# Patient Record
Sex: MALE | Race: WHITE | NOT HISPANIC OR LATINO | Employment: UNEMPLOYED | URBAN - METROPOLITAN AREA
[De-identification: names, ages, dates, MRNs, and addresses within clinical notes are randomized per-mention and may not be internally consistent; named-entity substitution may affect disease eponyms.]

---

## 2022-05-20 ENCOUNTER — HOSPITAL ENCOUNTER (INPATIENT)
Facility: HOSPITAL | Age: 62
LOS: 2 days | Discharge: HOME | DRG: 392 | End: 2022-05-22
Attending: EMERGENCY MEDICINE | Admitting: FAMILY MEDICINE
Payer: OTHER GOVERNMENT

## 2022-05-20 DIAGNOSIS — R07.9 CHEST PAIN: ICD-10-CM

## 2022-05-20 DIAGNOSIS — K57.92 ACUTE DIVERTICULITIS: Primary | ICD-10-CM

## 2022-05-20 DIAGNOSIS — K57.92 ACUTE DIVERTICULITIS: ICD-10-CM

## 2022-05-20 PROBLEM — D72.829 LEUKOCYTOSIS: Status: ACTIVE | Noted: 2022-05-20

## 2022-05-20 PROBLEM — I10 ESSENTIAL HYPERTENSION: Status: ACTIVE | Noted: 2022-05-20

## 2022-05-20 LAB
ALBUMIN SERPL BCP-MCNC: 4.2 G/DL (ref 3.5–5.2)
ALP SERPL-CCNC: 44 U/L (ref 55–135)
ALT SERPL W/O P-5'-P-CCNC: 20 U/L (ref 10–44)
ANION GAP SERPL CALC-SCNC: 8 MMOL/L (ref 8–16)
AST SERPL-CCNC: 18 U/L (ref 10–40)
BASOPHILS # BLD AUTO: 0.04 K/UL (ref 0–0.2)
BASOPHILS NFR BLD: 0.3 % (ref 0–1.9)
BILIRUB SERPL-MCNC: 1.5 MG/DL (ref 0.1–1)
BILIRUB UR QL STRIP: NEGATIVE
BUN SERPL-MCNC: 16 MG/DL (ref 6–20)
CALCIUM SERPL-MCNC: 9.3 MG/DL (ref 8.7–10.5)
CHLORIDE SERPL-SCNC: 100 MMOL/L (ref 95–110)
CLARITY UR: CLEAR
CO2 SERPL-SCNC: 25 MMOL/L (ref 23–29)
COLOR UR: YELLOW
CREAT SERPL-MCNC: 1.3 MG/DL (ref 0.5–1.4)
DIFFERENTIAL METHOD: ABNORMAL
EOSINOPHIL # BLD AUTO: 0.1 K/UL (ref 0–0.5)
EOSINOPHIL NFR BLD: 0.5 % (ref 0–8)
ERYTHROCYTE [DISTWIDTH] IN BLOOD BY AUTOMATED COUNT: 12.9 % (ref 11.5–14.5)
EST. GFR  (AFRICAN AMERICAN): >60 ML/MIN/1.73 M^2
EST. GFR  (NON AFRICAN AMERICAN): 59.7 ML/MIN/1.73 M^2
GLUCOSE SERPL-MCNC: 106 MG/DL (ref 70–110)
GLUCOSE UR QL STRIP: NEGATIVE
HCT VFR BLD AUTO: 44.6 % (ref 40–54)
HGB BLD-MCNC: 15 G/DL (ref 14–18)
HGB UR QL STRIP: NEGATIVE
IMM GRANULOCYTES # BLD AUTO: 0.06 K/UL (ref 0–0.04)
IMM GRANULOCYTES NFR BLD AUTO: 0.5 % (ref 0–0.5)
KETONES UR QL STRIP: NEGATIVE
LEUKOCYTE ESTERASE UR QL STRIP: NEGATIVE
LIPASE SERPL-CCNC: 34 U/L (ref 4–60)
LYMPHOCYTES # BLD AUTO: 2.3 K/UL (ref 1–4.8)
LYMPHOCYTES NFR BLD: 17.2 % (ref 18–48)
MCH RBC QN AUTO: 31.2 PG (ref 27–31)
MCHC RBC AUTO-ENTMCNC: 33.6 G/DL (ref 32–36)
MCV RBC AUTO: 93 FL (ref 82–98)
MONOCYTES # BLD AUTO: 1.4 K/UL (ref 0.3–1)
MONOCYTES NFR BLD: 10.3 % (ref 4–15)
NEUTROPHILS # BLD AUTO: 9.5 K/UL (ref 1.8–7.7)
NEUTROPHILS NFR BLD: 71.2 % (ref 38–73)
NITRITE UR QL STRIP: NEGATIVE
NRBC BLD-RTO: 0 /100 WBC
PH UR STRIP: 6 [PH] (ref 5–8)
PLATELET # BLD AUTO: 258 K/UL (ref 150–450)
PMV BLD AUTO: 10.2 FL (ref 9.2–12.9)
POTASSIUM SERPL-SCNC: 3.5 MMOL/L (ref 3.5–5.1)
PROT SERPL-MCNC: 7.3 G/DL (ref 6–8.4)
PROT UR QL STRIP: NEGATIVE
RBC # BLD AUTO: 4.81 M/UL (ref 4.6–6.2)
SARS-COV-2 RDRP RESP QL NAA+PROBE: NEGATIVE
SODIUM SERPL-SCNC: 133 MMOL/L (ref 136–145)
SP GR UR STRIP: 1.01 (ref 1–1.03)
URN SPEC COLLECT METH UR: NORMAL
UROBILINOGEN UR STRIP-ACNC: NEGATIVE EU/DL
WBC # BLD AUTO: 13.3 K/UL (ref 3.9–12.7)

## 2022-05-20 PROCEDURE — 12000002 HC ACUTE/MED SURGE SEMI-PRIVATE ROOM

## 2022-05-20 PROCEDURE — 81003 URINALYSIS AUTO W/O SCOPE: CPT | Performed by: EMERGENCY MEDICINE

## 2022-05-20 PROCEDURE — 83690 ASSAY OF LIPASE: CPT | Performed by: EMERGENCY MEDICINE

## 2022-05-20 PROCEDURE — 25000003 PHARM REV CODE 250: Performed by: EMERGENCY MEDICINE

## 2022-05-20 PROCEDURE — 25000003 PHARM REV CODE 250: Performed by: NURSE PRACTITIONER

## 2022-05-20 PROCEDURE — U0002 COVID-19 LAB TEST NON-CDC: HCPCS | Performed by: NURSE PRACTITIONER

## 2022-05-20 PROCEDURE — 63600175 PHARM REV CODE 636 W HCPCS: Performed by: NURSE PRACTITIONER

## 2022-05-20 PROCEDURE — 99285 EMERGENCY DEPT VISIT HI MDM: CPT | Mod: 25

## 2022-05-20 PROCEDURE — 85025 COMPLETE CBC W/AUTO DIFF WBC: CPT | Performed by: EMERGENCY MEDICINE

## 2022-05-20 PROCEDURE — S0030 INJECTION, METRONIDAZOLE: HCPCS | Performed by: EMERGENCY MEDICINE

## 2022-05-20 PROCEDURE — 80053 COMPREHEN METABOLIC PANEL: CPT | Performed by: EMERGENCY MEDICINE

## 2022-05-20 RX ORDER — METRONIDAZOLE 500 MG/1
500 TABLET ORAL
COMMUNITY
Start: 2021-10-18 | End: 2022-05-20

## 2022-05-20 RX ORDER — AMLODIPINE BESYLATE 5 MG/1
5 TABLET ORAL DAILY
Status: DISCONTINUED | OUTPATIENT
Start: 2022-05-21 | End: 2022-05-22 | Stop reason: HOSPADM

## 2022-05-20 RX ORDER — TALC
6 POWDER (GRAM) TOPICAL NIGHTLY PRN
Status: DISCONTINUED | OUTPATIENT
Start: 2022-05-20 | End: 2022-05-22 | Stop reason: HOSPADM

## 2022-05-20 RX ORDER — LANOLIN ALCOHOL/MO/W.PET/CERES
800 CREAM (GRAM) TOPICAL
Status: DISCONTINUED | OUTPATIENT
Start: 2022-05-20 | End: 2022-05-22 | Stop reason: HOSPADM

## 2022-05-20 RX ORDER — SIMVASTATIN 40 MG/1
40 TABLET, FILM COATED ORAL DAILY
COMMUNITY
Start: 2022-04-18

## 2022-05-20 RX ORDER — HYDROCODONE BITARTRATE AND ACETAMINOPHEN 5; 325 MG/1; MG/1
1 TABLET ORAL EVERY 6 HOURS PRN
Status: DISCONTINUED | OUTPATIENT
Start: 2022-05-20 | End: 2022-05-22 | Stop reason: HOSPADM

## 2022-05-20 RX ORDER — TELMISARTAN 40 MG/1
40 TABLET ORAL DAILY
COMMUNITY
Start: 2022-04-18

## 2022-05-20 RX ORDER — MULTIVITAMIN
1 TABLET ORAL DAILY
COMMUNITY

## 2022-05-20 RX ORDER — SODIUM CHLORIDE 9 MG/ML
1000 INJECTION, SOLUTION INTRAVENOUS
Status: COMPLETED | OUTPATIENT
Start: 2022-05-20 | End: 2022-05-20

## 2022-05-20 RX ORDER — MAGNESIUM SULFATE HEPTAHYDRATE 40 MG/ML
2 INJECTION, SOLUTION INTRAVENOUS
Status: DISCONTINUED | OUTPATIENT
Start: 2022-05-20 | End: 2022-05-22 | Stop reason: HOSPADM

## 2022-05-20 RX ORDER — FENOFIBRATE 145 MG/1
145 TABLET, FILM COATED ORAL DAILY
Status: DISCONTINUED | OUTPATIENT
Start: 2022-05-21 | End: 2022-05-22 | Stop reason: HOSPADM

## 2022-05-20 RX ORDER — MAGNESIUM SULFATE HEPTAHYDRATE 40 MG/ML
4 INJECTION, SOLUTION INTRAVENOUS
Status: DISCONTINUED | OUTPATIENT
Start: 2022-05-20 | End: 2022-05-22 | Stop reason: HOSPADM

## 2022-05-20 RX ORDER — POTASSIUM CHLORIDE 20 MEQ/1
40 TABLET, EXTENDED RELEASE ORAL
Status: DISCONTINUED | OUTPATIENT
Start: 2022-05-20 | End: 2022-05-22 | Stop reason: HOSPADM

## 2022-05-20 RX ORDER — SODIUM CHLORIDE 0.9 % (FLUSH) 0.9 %
10 SYRINGE (ML) INJECTION EVERY 12 HOURS PRN
Status: DISCONTINUED | OUTPATIENT
Start: 2022-05-20 | End: 2022-05-22 | Stop reason: HOSPADM

## 2022-05-20 RX ORDER — MORPHINE SULFATE 4 MG/ML
4 INJECTION, SOLUTION INTRAMUSCULAR; INTRAVENOUS EVERY 4 HOURS PRN
Status: DISCONTINUED | OUTPATIENT
Start: 2022-05-20 | End: 2022-05-22 | Stop reason: HOSPADM

## 2022-05-20 RX ORDER — ONDANSETRON 2 MG/ML
4 INJECTION INTRAMUSCULAR; INTRAVENOUS
Status: DISPENSED | OUTPATIENT
Start: 2022-05-20 | End: 2022-05-21

## 2022-05-20 RX ORDER — NALOXONE HCL 0.4 MG/ML
0.02 VIAL (ML) INJECTION
Status: DISCONTINUED | OUTPATIENT
Start: 2022-05-20 | End: 2022-05-22 | Stop reason: HOSPADM

## 2022-05-20 RX ORDER — ACETAMINOPHEN 325 MG/1
650 TABLET ORAL EVERY 4 HOURS PRN
Status: DISCONTINUED | OUTPATIENT
Start: 2022-05-20 | End: 2022-05-22 | Stop reason: HOSPADM

## 2022-05-20 RX ORDER — CIPROFLOXACIN 750 MG/1
750 TABLET, FILM COATED ORAL
COMMUNITY
Start: 2021-10-18 | End: 2022-05-20

## 2022-05-20 RX ORDER — AMLODIPINE BESYLATE 5 MG/1
5 TABLET ORAL DAILY
COMMUNITY
Start: 2022-04-18

## 2022-05-20 RX ORDER — METRONIDAZOLE 500 MG/100ML
500 INJECTION, SOLUTION INTRAVENOUS
Status: COMPLETED | OUTPATIENT
Start: 2022-05-20 | End: 2022-05-20

## 2022-05-20 RX ORDER — POTASSIUM CHLORIDE 20 MEQ/1
20 TABLET, EXTENDED RELEASE ORAL
Status: DISCONTINUED | OUTPATIENT
Start: 2022-05-20 | End: 2022-05-22 | Stop reason: HOSPADM

## 2022-05-20 RX ORDER — ATORVASTATIN CALCIUM 20 MG/1
20 TABLET, FILM COATED ORAL DAILY
Status: DISCONTINUED | OUTPATIENT
Start: 2022-05-21 | End: 2022-05-22 | Stop reason: HOSPADM

## 2022-05-20 RX ORDER — AMOXICILLIN 500 MG
1 CAPSULE ORAL DAILY
COMMUNITY

## 2022-05-20 RX ORDER — ONDANSETRON 2 MG/ML
4 INJECTION INTRAMUSCULAR; INTRAVENOUS EVERY 8 HOURS PRN
Status: DISCONTINUED | OUTPATIENT
Start: 2022-05-20 | End: 2022-05-22 | Stop reason: HOSPADM

## 2022-05-20 RX ORDER — MORPHINE SULFATE 2 MG/ML
2 INJECTION, SOLUTION INTRAMUSCULAR; INTRAVENOUS
Status: DISPENSED | OUTPATIENT
Start: 2022-05-20 | End: 2022-05-21

## 2022-05-20 RX ORDER — FENOFIBRATE 145 MG/1
145 TABLET, FILM COATED ORAL DAILY
COMMUNITY
Start: 2022-04-18

## 2022-05-20 RX ORDER — SODIUM CHLORIDE, SODIUM LACTATE, POTASSIUM CHLORIDE, CALCIUM CHLORIDE 600; 310; 30; 20 MG/100ML; MG/100ML; MG/100ML; MG/100ML
INJECTION, SOLUTION INTRAVENOUS CONTINUOUS
Status: DISCONTINUED | OUTPATIENT
Start: 2022-05-20 | End: 2022-05-21

## 2022-05-20 RX ORDER — LOSARTAN POTASSIUM 50 MG/1
50 TABLET ORAL DAILY
Status: DISCONTINUED | OUTPATIENT
Start: 2022-05-21 | End: 2022-05-22 | Stop reason: HOSPADM

## 2022-05-20 RX ORDER — ASPIRIN 81 MG/1
81 TABLET ORAL DAILY
COMMUNITY

## 2022-05-20 RX ORDER — MAGNESIUM SULFATE 1 G/100ML
1 INJECTION INTRAVENOUS
Status: DISCONTINUED | OUTPATIENT
Start: 2022-05-20 | End: 2022-05-22 | Stop reason: HOSPADM

## 2022-05-20 RX ADMIN — SODIUM CHLORIDE, SODIUM LACTATE, POTASSIUM CHLORIDE, AND CALCIUM CHLORIDE: .6; .31; .03; .02 INJECTION, SOLUTION INTRAVENOUS at 08:05

## 2022-05-20 RX ADMIN — SODIUM CHLORIDE 1000 ML: 0.9 INJECTION, SOLUTION INTRAVENOUS at 04:05

## 2022-05-20 RX ADMIN — PIPERACILLIN SODIUM,TAZOBACTAM SODIUM 3.38 G: 3; .375 INJECTION, POWDER, FOR SOLUTION INTRAVENOUS at 07:05

## 2022-05-20 RX ADMIN — METRONIDAZOLE 500 MG: 500 INJECTION, SOLUTION INTRAVENOUS at 04:05

## 2022-05-20 NOTE — H&P
[facility]  History & Physical    DOS: 05/20/2022  3:49 PM    Patient Name: Omari Jaquez  MRN: [id number]  Patient Class: IP- Inpatient  Admission Date: 5/20/2022  Attending Physician: Dr. Nicole  Primary Care Provider: Kojo Mcqueen MD         Patient information was obtained from patient, past medical records and ER records.     Subjective:     Principal Problem:Acute diverticulitis    Chief Complaint:   Chief Complaint   Patient presents with    Abdominal Pain     RLQ x1 days, hurts to palpate. Denies n/v/d        HPI: Mr. Jaquez is a 61 year old male with a history of HTN, HLD, and diverticulitis who presents today with complaints of RLQ pain beginning yesterday. It is moderate. It is associated with mild nausea. He denies diarrhea, melena, hematochezia, vomiting, chest pain or SOB. On arrival to the ED he had a CT abd/pelvis that revealed: Moderate acute cecal diverticulitis with a tiny focal contained perforation without evidence of abscess. WBCS 13K. Temp on arrival was 100.1.       Past Medical History:   Diagnosis Date    Digestive disorder     Hypertension     Mixed hyperlipidemia        Past Surgical History:   Procedure Laterality Date    COLONOSCOPY         Review of patient's allergies indicates:  No Known Allergies    No current facility-administered medications on file prior to encounter.     Current Outpatient Medications on File Prior to Encounter   Medication Sig    amLODIPine (NORVASC) 5 MG tablet Take 5 mg by mouth once daily.    aspirin (ECOTRIN) 81 MG EC tablet Take 81 mg by mouth once daily.    fenofibrate (TRICOR) 145 MG tablet Take 145 mg by mouth once daily.    multivitamin (THERAGRAN) per tablet Take 1 tablet by mouth once daily.    omega-3 fatty acids/fish oil (FISH OIL-OMEGA-3 FATTY ACIDS) 300-1,000 mg capsule Take 1 capsule by mouth once daily.    simvastatin (ZOCOR) 40 MG tablet Take 40 mg by mouth once daily.    telmisartan (MICARDIS)  40 MG Tab Take 40 mg by mouth once daily.    [DISCONTINUED] ciprofloxacin HCl (CIPRO) 750 MG tablet Take 750 mg by mouth.    [DISCONTINUED] metroNIDAZOLE (FLAGYL) 500 MG tablet Take 500 mg by mouth.     Family History       Problem Relation (Age of Onset)    Hyperlipidemia Father          Tobacco Use    Smoking status: Unknown If Ever Smoked    Smokeless tobacco: Not on file   Substance and Sexual Activity    Alcohol use: Not Currently    Drug use: Not on file    Sexual activity: Not on file     Review of Systems   Constitutional:  Positive for fever. Negative for chills, diaphoresis and fatigue.   HENT:  Negative for congestion, ear pain, sore throat and trouble swallowing.    Eyes:  Negative for pain, discharge and visual disturbance.   Respiratory:  Negative for cough, chest tightness, shortness of breath and wheezing.    Cardiovascular:  Negative for chest pain, palpitations and leg swelling.   Gastrointestinal:  Positive for abdominal pain and nausea. Negative for abdominal distention, blood in stool, constipation, diarrhea and vomiting.   Endocrine: Negative for polydipsia, polyphagia and polyuria.   Genitourinary:  Negative for dysuria, flank pain, frequency and urgency.   Musculoskeletal:  Negative for back pain, joint swelling, neck pain and neck stiffness.   Skin:  Negative for rash and wound.   Allergic/Immunologic: Negative for immunocompromised state.   Neurological:  Negative for dizziness, syncope, speech difficulty, weakness, light-headedness, numbness and headaches.   Hematological:  Negative for adenopathy.   Psychiatric/Behavioral:  Negative for confusion and suicidal ideas. The patient is not nervous/anxious.    All other systems reviewed and are negative.  Objective:     Vital Signs (Most Recent):  Temp: 100.1 °F (37.8 °C) (05/20/22 1245)  Pulse: 87 (05/20/22 1630)  Resp: (!) 25 (05/20/22 1630)  BP: 129/75 (05/20/22 1630)  SpO2: (!) 93 % (05/20/22 1630)   Vital Signs (24h Range):  Temp:   [100.1 °F (37.8 °C)] 100.1 °F (37.8 °C)  Pulse:  [83-95] 87  Resp:  [16-27] 25  SpO2:  [93 %-96 %] 93 %  BP: (116-135)/(59-88) 129/75     Weight: 106.6 kg (235 lb)  Body mass index is 34.7 kg/m².    Physical Exam  Vitals and nursing note reviewed.   Constitutional:       Appearance: He is well-developed.   HENT:      Head: Normocephalic and atraumatic.   Eyes:      Conjunctiva/sclera: Conjunctivae normal.      Pupils: Pupils are equal, round, and reactive to light.   Cardiovascular:      Rate and Rhythm: Normal rate and regular rhythm.      Heart sounds: Normal heart sounds.   Pulmonary:      Effort: Pulmonary effort is normal.      Breath sounds: Normal breath sounds.   Abdominal:      General: Bowel sounds are normal.      Palpations: Abdomen is soft.      Tenderness: There is abdominal tenderness.      Comments: RLQ tenderness   Musculoskeletal:         General: Normal range of motion.      Cervical back: Normal range of motion and neck supple.   Skin:     General: Skin is warm and dry.      Capillary Refill: Capillary refill takes less than 2 seconds.   Neurological:      Mental Status: He is alert and oriented to person, place, and time.   Psychiatric:         Behavior: Behavior normal.         Thought Content: Thought content normal.         Judgment: Judgment normal.         CRANIAL NERVES     CN III, IV, VI   Pupils are equal, round, and reactive to light.     Significant Labs: All pertinent labs within the past 24 hours have been reviewed.  CBC:   Recent Labs   Lab 05/20/22  1328   WBC 13.30*   HGB 15.0   HCT 44.6        CMP:   Recent Labs   Lab 05/20/22  1328   *   K 3.5      CO2 25      BUN 16   CREATININE 1.3   CALCIUM 9.3   PROT 7.3   ALBUMIN 4.2   BILITOT 1.5*   ALKPHOS 44*   AST 18   ALT 20   ANIONGAP 8   EGFRNONAA 59.7*       Significant Imaging: I have reviewed all pertinent imaging results/findings within the past 24 hours.    CT Abdomen Pelvis  Without Contrast    Result  Date: 5/20/2022  CMS MANDATED QUALITY DATA - CT RADIATION  436 All CT scans at this facility utilize dose modulation, iterative reconstruction, and/or weight based dosing when appropriate to reduce radiation dose to as low as reasonably achievable. CLINICAL HISTORY: 61 years (1960) Male RLQ abdominal pain (Age >= 14y) RLQ abd pain TECHNIQUE: CT ABDOMEN PELVIS WITHOUT IV CONTRAST. 299 images obtained. Axial CT images of the abdomen and pelvis were obtained from the dome of the diaphragm to the proximal thigh. CONTRAST: No IV contrast was administered COMPARISON: None available. FINDINGS:. Lower Thorax: The lungs are essentially clear noting linear dependent atelectasis versus scarring. There is no pleural or pericardial effusion on these images. The heart is normal in size. CT Abdomen: Liver: Relative diffuse low-attenuation liver consistent with hepatic steatosis. The liver is enlarged measuring 19 cm sagittal right lobe. Gallbladder: The gallbladder is within normal limits. Biliary Tree: No intra or extrahepatic ductal dilation. Spleen: Within normal limits. Pancreas: The pancreas is normal. Adrenal Glands: The adrenal glands appear within normal limits. Kidneys: The kidneys are normal in imaging appearance without hydronephrosis or hydroureter. There is a likely cyst in the inferior pole the left kidney. Vasculature: Scattered calcified plaques are seen in the distal abdominal aorta and iliacs with no aneurysm. Lymph nodes: No abdominal lymphadenopathy is seen. Intraperitoneal structures: There is no ascites. Bowel: Moderate scattered colonic diverticulosis and findings of focal acute diverticulitis in the cecal: (Image 125). There appears to be a focal contained microperforation adjacent to the diverticulitis (image 114) but no discrete abscess, intra-abdominal free air or obstruction. The appendix is normal (image 151). Abdominal wall: The abdominal wall and musculature are normal. Musculoskeletal: No acute  osseous abnormality is identified . CT Pelvis: Bladder: The urinary bladder is full. Consider correlation with urinalysis would and for bladder outlet obstruction. Reproductive Organs: Regenia is moderately enlarged prostate gland. Pelvic Lymph nodes: No pelvic lymphadenopathy or pelvic mass is identified. IMPRESSION: 1. Moderate acute cecal diverticulitis with a tiny focal contained perforation. No finding of obstruction or discrete abscess. 2. Hepatomegaly and findings of steatosis. 3. Additional and incidental findings as above. . Electronically signed by:  Seth Barros MD  5/20/2022 2:25 PM CDT Workstation: [id number]       Assessment/Plan:     * Acute diverticulitis with microperforation  Admit to med/surg  Consult gen surgery   NPO except sips with meds  Started on zosyn and flagyl in ED   No blood cultures drawn prior to this - will draw   Pain control  Hold aspirin in the event he requires surgical intervention    Essential hypertension  Continue appropriate home meds      Leukocytosis  D/t above, repeat CBC in AM       VTE Risk Mitigation (From admission, onward)    None             Keyla Jauregui NP  Department of Hospital Medicine   formerly [facility]

## 2022-05-20 NOTE — SUBJECTIVE & OBJECTIVE
Past Medical History:   Diagnosis Date    Digestive disorder     Hypertension     Mixed hyperlipidemia        Past Surgical History:   Procedure Laterality Date    COLONOSCOPY         Review of patient's allergies indicates:  No Known Allergies    No current facility-administered medications on file prior to encounter.     Current Outpatient Medications on File Prior to Encounter   Medication Sig    amLODIPine (NORVASC) 5 MG tablet Take 5 mg by mouth once daily.    aspirin (ECOTRIN) 81 MG EC tablet Take 81 mg by mouth once daily.    fenofibrate (TRICOR) 145 MG tablet Take 145 mg by mouth once daily.    multivitamin (THERAGRAN) per tablet Take 1 tablet by mouth once daily.    omega-3 fatty acids/fish oil (FISH OIL-OMEGA-3 FATTY ACIDS) 300-1,000 mg capsule Take 1 capsule by mouth once daily.    simvastatin (ZOCOR) 40 MG tablet Take 40 mg by mouth once daily.    telmisartan (MICARDIS) 40 MG Tab Take 40 mg by mouth once daily.    [DISCONTINUED] ciprofloxacin HCl (CIPRO) 750 MG tablet Take 750 mg by mouth.    [DISCONTINUED] metroNIDAZOLE (FLAGYL) 500 MG tablet Take 500 mg by mouth.     Family History       Problem Relation (Age of Onset)    Hyperlipidemia Father          Tobacco Use    Smoking status: Unknown If Ever Smoked    Smokeless tobacco: Not on file   Substance and Sexual Activity    Alcohol use: Not Currently    Drug use: Not on file    Sexual activity: Not on file     Review of Systems   Constitutional:  Positive for fever. Negative for chills, diaphoresis and fatigue.   HENT:  Negative for congestion, ear pain, sore throat and trouble swallowing.    Eyes:  Negative for pain, discharge and visual disturbance.   Respiratory:  Negative for cough, chest tightness, shortness of breath and wheezing.    Cardiovascular:  Negative for chest pain, palpitations and leg swelling.   Gastrointestinal:  Positive for abdominal pain and nausea. Negative for abdominal distention, blood in stool, constipation, diarrhea and  vomiting.   Endocrine: Negative for polydipsia, polyphagia and polyuria.   Genitourinary:  Negative for dysuria, flank pain, frequency and urgency.   Musculoskeletal:  Negative for back pain, joint swelling, neck pain and neck stiffness.   Skin:  Negative for rash and wound.   Allergic/Immunologic: Negative for immunocompromised state.   Neurological:  Negative for dizziness, syncope, speech difficulty, weakness, light-headedness, numbness and headaches.   Hematological:  Negative for adenopathy.   Psychiatric/Behavioral:  Negative for confusion and suicidal ideas. The patient is not nervous/anxious.    All other systems reviewed and are negative.  Objective:     Vital Signs (Most Recent):  Temp: 100.1 °F (37.8 °C) (05/20/22 1245)  Pulse: 87 (05/20/22 1630)  Resp: (!) 25 (05/20/22 1630)  BP: 129/75 (05/20/22 1630)  SpO2: (!) 93 % (05/20/22 1630)   Vital Signs (24h Range):  Temp:  [100.1 °F (37.8 °C)] 100.1 °F (37.8 °C)  Pulse:  [83-95] 87  Resp:  [16-27] 25  SpO2:  [93 %-96 %] 93 %  BP: (116-135)/(59-88) 129/75     Weight: 106.6 kg (235 lb)  Body mass index is 34.7 kg/m².    Physical Exam  Vitals and nursing note reviewed.   Constitutional:       Appearance: He is well-developed.   HENT:      Head: Normocephalic and atraumatic.   Eyes:      Conjunctiva/sclera: Conjunctivae normal.      Pupils: Pupils are equal, round, and reactive to light.   Cardiovascular:      Rate and Rhythm: Normal rate and regular rhythm.      Heart sounds: Normal heart sounds.   Pulmonary:      Effort: Pulmonary effort is normal.      Breath sounds: Normal breath sounds.   Abdominal:      General: Bowel sounds are normal.      Palpations: Abdomen is soft.      Tenderness: There is abdominal tenderness.      Comments: RLQ tenderness   Musculoskeletal:         General: Normal range of motion.      Cervical back: Normal range of motion and neck supple.   Skin:     General: Skin is warm and dry.      Capillary Refill: Capillary refill takes less  than 2 seconds.   Neurological:      Mental Status: He is alert and oriented to person, place, and time.   Psychiatric:         Behavior: Behavior normal.         Thought Content: Thought content normal.         Judgment: Judgment normal.         CRANIAL NERVES     CN III, IV, VI   Pupils are equal, round, and reactive to light.     Significant Labs: All pertinent labs within the past 24 hours have been reviewed.  CBC:   Recent Labs   Lab 05/20/22  1328   WBC 13.30*   HGB 15.0   HCT 44.6        CMP:   Recent Labs   Lab 05/20/22  1328   *   K 3.5      CO2 25      BUN 16   CREATININE 1.3   CALCIUM 9.3   PROT 7.3   ALBUMIN 4.2   BILITOT 1.5*   ALKPHOS 44*   AST 18   ALT 20   ANIONGAP 8   EGFRNONAA 59.7*       Significant Imaging: I have reviewed all pertinent imaging results/findings within the past 24 hours.    CT Abdomen Pelvis  Without Contrast    Result Date: 5/20/2022  CMS MANDATED QUALITY DATA - CT RADIATION  436 All CT scans at this facility utilize dose modulation, iterative reconstruction, and/or weight based dosing when appropriate to reduce radiation dose to as low as reasonably achievable. CLINICAL HISTORY: 61 years (1960) Male RLQ abdominal pain (Age >= 14y) RLQ abd pain TECHNIQUE: CT ABDOMEN PELVIS WITHOUT IV CONTRAST. 299 images obtained. Axial CT images of the abdomen and pelvis were obtained from the dome of the diaphragm to the proximal thigh. CONTRAST: No IV contrast was administered COMPARISON: None available. FINDINGS:. Lower Thorax: The lungs are essentially clear noting linear dependent atelectasis versus scarring. There is no pleural or pericardial effusion on these images. The heart is normal in size. CT Abdomen: Liver: Relative diffuse low-attenuation liver consistent with hepatic steatosis. The liver is enlarged measuring 19 cm sagittal right lobe. Gallbladder: The gallbladder is within normal limits. Biliary Tree: No intra or extrahepatic ductal dilation.  Spleen: Within normal limits. Pancreas: The pancreas is normal. Adrenal Glands: The adrenal glands appear within normal limits. Kidneys: The kidneys are normal in imaging appearance without hydronephrosis or hydroureter. There is a likely cyst in the inferior pole the left kidney. Vasculature: Scattered calcified plaques are seen in the distal abdominal aorta and iliacs with no aneurysm. Lymph nodes: No abdominal lymphadenopathy is seen. Intraperitoneal structures: There is no ascites. Bowel: Moderate scattered colonic diverticulosis and findings of focal acute diverticulitis in the cecal: (Image 125). There appears to be a focal contained microperforation adjacent to the diverticulitis (image 114) but no discrete abscess, intra-abdominal free air or obstruction. The appendix is normal (image 151). Abdominal wall: The abdominal wall and musculature are normal. Musculoskeletal: No acute osseous abnormality is identified . CT Pelvis: Bladder: The urinary bladder is full. Consider correlation with urinalysis would and for bladder outlet obstruction. Reproductive Organs: Regenia is moderately enlarged prostate gland. Pelvic Lymph nodes: No pelvic lymphadenopathy or pelvic mass is identified. IMPRESSION: 1. Moderate acute cecal diverticulitis with a tiny focal contained perforation. No finding of obstruction or discrete abscess. 2. Hepatomegaly and findings of steatosis. 3. Additional and incidental findings as above. . Electronically signed by:  Seth Barros MD  5/20/2022 2:25 PM CDT Workstation: [id number]

## 2022-05-20 NOTE — ED PROVIDER NOTES
Encounter Date: 5/20/2022       History     Chief Complaint   Patient presents with    Abdominal Pain     RLQ x1 days, hurts to palpate. Denies n/v/d     Patient here with reported onset of right lower abdominal pain this started yesterday symptoms have B continued this morning there is no nausea vomiting or diarrhea does report some mild constipation states he has not had a bowel movement today he is running low-grade fevers some chills he does report a history of diverticulitis has never had symptoms on the right side no previous abdominal surgery no recent illness or sick contacts noted no dysuria urgency or frequency no hematuria no blood in his stool no other complaints        Review of patient's allergies indicates:  No Known Allergies  Past Medical History:   Diagnosis Date    Digestive disorder     Hypertension     Mixed hyperlipidemia      Past Surgical History:   Procedure Laterality Date    COLONOSCOPY       Family History   Problem Relation Age of Onset    Hyperlipidemia Father      Social History     Tobacco Use    Smoking status: Unknown If Ever Smoked   Substance Use Topics    Alcohol use: Not Currently     Review of Systems   Constitutional: Positive for chills and fever.   Gastrointestinal: Positive for abdominal pain and constipation. Negative for blood in stool, diarrhea, nausea and vomiting.   All other systems reviewed and are negative.      Physical Exam     Initial Vitals [05/20/22 1245]   BP Pulse Resp Temp SpO2   134/88 95 16 100.1 °F (37.8 °C) 95 %      MAP       --         Physical Exam    Constitutional: He appears well-developed and well-nourished. No distress.   HENT:   Head: Normocephalic and atraumatic.   Right Ear: External ear normal.   Left Ear: External ear normal.   Mouth/Throat: Oropharynx is clear and moist.   Eyes: Pupils are equal, round, and reactive to light.   Neck: Neck supple.   Normal range of motion.  Cardiovascular: Normal rate, regular rhythm, S1 normal, S2  normal, normal heart sounds and intact distal pulses.   Pulmonary/Chest: Breath sounds normal.   Abdominal: Abdomen is soft. Bowel sounds are normal. There is abdominal tenderness in the right lower quadrant.   Musculoskeletal:         General: Normal range of motion.      Cervical back: Normal range of motion and neck supple.     Neurological: He is alert and oriented to person, place, and time. GCS eye subscore is 4. GCS verbal subscore is 5. GCS motor subscore is 6.   Skin: Skin is warm and dry. No rash noted.   Psychiatric: He has a normal mood and affect. His behavior is normal.         ED Course   Procedures  Labs Reviewed   CBC W/ AUTO DIFFERENTIAL - Abnormal; Notable for the following components:       Result Value    WBC 13.30 (*)     MCH 31.2 (*)     Gran # (ANC) 9.5 (*)     Immature Grans (Abs) 0.06 (*)     Mono # 1.4 (*)     Lymph % 17.2 (*)     All other components within normal limits   COMPREHENSIVE METABOLIC PANEL - Abnormal; Notable for the following components:    Sodium 133 (*)     Total Bilirubin 1.5 (*)     Alkaline Phosphatase 44 (*)     eGFR if non  59.7 (*)     All other components within normal limits   CULTURE, BLOOD   LIPASE   URINALYSIS, REFLEX TO URINE CULTURE    Narrative:     Specimen Source->Urine   SARS-COV-2 RNA AMPLIFICATION, QUAL          Imaging Results          CT Abdomen Pelvis  Without Contrast (Final result)  Result time 05/20/22 14:25:09    Final result by Seth Barros MD (05/20/22 14:25:09)                 Narrative:    CMS MANDATED QUALITY DATA - CT RADIATION  436    All CT scans at this facility utilize dose modulation, iterative reconstruction, and/or weight based dosing when appropriate to reduce radiation dose to as low as reasonably achievable.    CLINICAL HISTORY:  61 years (1960) Male RLQ abdominal pain (Age >= 14y) RLQ abd pain    TECHNIQUE:  CT ABDOMEN PELVIS WITHOUT IV CONTRAST. 299 images obtained. Axial CT images of the abdomen and  pelvis were obtained from the dome of the diaphragm to the proximal thigh.    CONTRAST:  No IV contrast was administered    COMPARISON:  None available.    FINDINGS:.  Lower Thorax:  The lungs are essentially clear noting linear dependent atelectasis versus scarring. There is no pleural or pericardial effusion on these images. The heart is normal in size.    CT Abdomen:  Liver: Relative diffuse low-attenuation liver consistent with hepatic steatosis. The liver is enlarged measuring 19 cm sagittal right lobe.  Gallbladder: The gallbladder is within normal limits.  Biliary Tree: No intra or extrahepatic ductal dilation.  Spleen: Within normal limits.  Pancreas: The pancreas is normal.  Adrenal Glands: The adrenal glands appear within normal limits.  Kidneys: The kidneys are normal in imaging appearance without hydronephrosis or hydroureter. There is a likely cyst in the inferior pole the left kidney.  Vasculature: Scattered calcified plaques are seen in the distal abdominal aorta and iliacs with no aneurysm.  Lymph nodes: No abdominal lymphadenopathy is seen.  Intraperitoneal structures: There is no ascites.  Bowel: Moderate scattered colonic diverticulosis and findings of focal acute diverticulitis in the cecal: (Image 125). There appears to be a focal contained microperforation adjacent to the diverticulitis (image 114) but no discrete abscess, intra-abdominal free air or obstruction. The appendix is normal (image 151).  Abdominal wall: The abdominal wall and musculature are normal.  Musculoskeletal: No acute osseous abnormality is identified .    CT Pelvis:  Bladder: The urinary bladder is full. Consider correlation with urinalysis would and for bladder outlet obstruction.  Reproductive Organs: Regenia is moderately enlarged prostate gland.  Pelvic Lymph nodes: No pelvic lymphadenopathy or pelvic mass is identified.    IMPRESSION:  1. Moderate acute cecal diverticulitis with a tiny focal contained perforation. No  finding of obstruction or discrete abscess.  2. Hepatomegaly and findings of steatosis.  3. Additional and incidental findings as above.                    .    Electronically signed by:  Seth Barros MD  5/20/2022 2:25 PM CDT Workstation: [id number]                               Medications   morphine injection 2 mg (2 mg Intravenous Not Given 5/20/22 1433)   ondansetron injection 4 mg (4 mg Intravenous Not Given 5/20/22 1434)   piperacillin-tazobactam 3.375 g in dextrose 5 % 50 mL IVPB (ready to mix system) (3.375 g Intravenous New Bag 5/20/22 1913)   metronidazole IVPB 500 mg (0 mg Intravenous Stopped 5/20/22 1731)   0.9%  NaCl infusion (0 mLs Intravenous Stopped 5/20/22 1913)     Medical Decision Making:   ED Management:  Patient's CT scan shows evidence of diverticulitis a cecal area with micro perforation abscess formation advised patient of these findings I have consult Dr. Ceron who will see patient tomorrow I have begun IV Zosyn and Flagyl in addition to IV fluid it is and discussed patient's findings with the hospitalist to evaluate patient emergency department for admission                      Clinical Impression:   Final diagnoses:  [K57.92] Acute diverticulitis          ED Disposition Condition    Admit               Judah Paz MD  05/20/22 2007       Judah Paz MD  05/20/22 2008

## 2022-05-20 NOTE — HPI
Mr. Jaquez is a 61 year old male with a history of HTN, HLD, and diverticulitis who presents today with complaints of RLQ pain beginning yesterday. It is moderate. It is associated with mild nausea. He denies diarrhea, melena, hematochezia, vomiting, chest pain or SOB. On arrival to the ED he had a CT abd/pelvis that revealed: Moderate acute cecal diverticulitis with a tiny focal contained perforation without evidence of abscess. WBCS 13K. Temp on arrival was 100.1.

## 2022-05-20 NOTE — ASSESSMENT & PLAN NOTE
Admit to med/surg  Consult gen surgery   NPO except sips with meds  Started on zosyn and flagyl in ED   No blood cultures drawn prior to this - will draw   Pain control  Hold aspirin in the event he requires surgical intervention

## 2022-05-21 LAB
ANION GAP SERPL CALC-SCNC: 10 MMOL/L (ref 8–16)
BASOPHILS # BLD AUTO: 0.05 K/UL (ref 0–0.2)
BASOPHILS NFR BLD: 0.5 % (ref 0–1.9)
BUN SERPL-MCNC: 16 MG/DL (ref 6–20)
CALCIUM SERPL-MCNC: 8.9 MG/DL (ref 8.7–10.5)
CHLORIDE SERPL-SCNC: 101 MMOL/L (ref 95–110)
CO2 SERPL-SCNC: 26 MMOL/L (ref 23–29)
CREAT SERPL-MCNC: 1.3 MG/DL (ref 0.5–1.4)
DIFFERENTIAL METHOD: ABNORMAL
EOSINOPHIL # BLD AUTO: 0.2 K/UL (ref 0–0.5)
EOSINOPHIL NFR BLD: 2 % (ref 0–8)
ERYTHROCYTE [DISTWIDTH] IN BLOOD BY AUTOMATED COUNT: 12.9 % (ref 11.5–14.5)
EST. GFR  (AFRICAN AMERICAN): >60 ML/MIN/1.73 M^2
EST. GFR  (NON AFRICAN AMERICAN): 59.7 ML/MIN/1.73 M^2
GLUCOSE SERPL-MCNC: 99 MG/DL (ref 70–110)
HCT VFR BLD AUTO: 42.3 % (ref 40–54)
HGB BLD-MCNC: 14 G/DL (ref 14–18)
IMM GRANULOCYTES # BLD AUTO: 0.04 K/UL (ref 0–0.04)
IMM GRANULOCYTES NFR BLD AUTO: 0.4 % (ref 0–0.5)
LYMPHOCYTES # BLD AUTO: 2.4 K/UL (ref 1–4.8)
LYMPHOCYTES NFR BLD: 22.6 % (ref 18–48)
MAGNESIUM SERPL-MCNC: 1.8 MG/DL (ref 1.6–2.6)
MCH RBC QN AUTO: 31.5 PG (ref 27–31)
MCHC RBC AUTO-ENTMCNC: 33.1 G/DL (ref 32–36)
MCV RBC AUTO: 95 FL (ref 82–98)
MONOCYTES # BLD AUTO: 1 K/UL (ref 0.3–1)
MONOCYTES NFR BLD: 10 % (ref 4–15)
NEUTROPHILS # BLD AUTO: 6.7 K/UL (ref 1.8–7.7)
NEUTROPHILS NFR BLD: 64.5 % (ref 38–73)
NRBC BLD-RTO: 0 /100 WBC
PLATELET # BLD AUTO: 250 K/UL (ref 150–450)
PMV BLD AUTO: 10.5 FL (ref 9.2–12.9)
POTASSIUM SERPL-SCNC: 3.4 MMOL/L (ref 3.5–5.1)
RBC # BLD AUTO: 4.44 M/UL (ref 4.6–6.2)
SODIUM SERPL-SCNC: 137 MMOL/L (ref 136–145)
WBC # BLD AUTO: 10.38 K/UL (ref 3.9–12.7)

## 2022-05-21 PROCEDURE — 99222 1ST HOSP IP/OBS MODERATE 55: CPT | Mod: ,,, | Performed by: SURGERY

## 2022-05-21 PROCEDURE — 83735 ASSAY OF MAGNESIUM: CPT | Performed by: NURSE PRACTITIONER

## 2022-05-21 PROCEDURE — 85025 COMPLETE CBC W/AUTO DIFF WBC: CPT | Performed by: NURSE PRACTITIONER

## 2022-05-21 PROCEDURE — 99222 PR INITIAL HOSPITAL CARE,LEVL II: ICD-10-PCS | Mod: ,,, | Performed by: SURGERY

## 2022-05-21 PROCEDURE — 25000003 PHARM REV CODE 250: Performed by: NURSE PRACTITIONER

## 2022-05-21 PROCEDURE — 25000003 PHARM REV CODE 250: Performed by: INTERNAL MEDICINE

## 2022-05-21 PROCEDURE — 63600175 PHARM REV CODE 636 W HCPCS: Performed by: NURSE PRACTITIONER

## 2022-05-21 PROCEDURE — 87040 BLOOD CULTURE FOR BACTERIA: CPT | Performed by: NURSE PRACTITIONER

## 2022-05-21 PROCEDURE — 36415 COLL VENOUS BLD VENIPUNCTURE: CPT | Performed by: NURSE PRACTITIONER

## 2022-05-21 PROCEDURE — 12000002 HC ACUTE/MED SURGE SEMI-PRIVATE ROOM

## 2022-05-21 PROCEDURE — 80048 BASIC METABOLIC PNL TOTAL CA: CPT | Performed by: NURSE PRACTITIONER

## 2022-05-21 PROCEDURE — 63600175 PHARM REV CODE 636 W HCPCS: Performed by: INTERNAL MEDICINE

## 2022-05-21 RX ORDER — FLUCONAZOLE 2 MG/ML
100 INJECTION, SOLUTION INTRAVENOUS
Status: DISCONTINUED | OUTPATIENT
Start: 2022-05-21 | End: 2022-05-22 | Stop reason: HOSPADM

## 2022-05-21 RX ORDER — SODIUM CHLORIDE 9 MG/ML
INJECTION, SOLUTION INTRAVENOUS CONTINUOUS
Status: DISCONTINUED | OUTPATIENT
Start: 2022-05-21 | End: 2022-05-22 | Stop reason: HOSPADM

## 2022-05-21 RX ADMIN — FLUCONAZOLE 100 MG: 2 INJECTION, SOLUTION INTRAVENOUS at 08:05

## 2022-05-21 RX ADMIN — PIPERACILLIN SODIUM,TAZOBACTAM SODIUM 3.38 G: 3; .375 INJECTION, POWDER, FOR SOLUTION INTRAVENOUS at 04:05

## 2022-05-21 RX ADMIN — FENOFIBRATE 145 MG: 145 TABLET, FILM COATED ORAL at 09:05

## 2022-05-21 RX ADMIN — ATORVASTATIN CALCIUM 20 MG: 20 TABLET, FILM COATED ORAL at 09:05

## 2022-05-21 RX ADMIN — SODIUM CHLORIDE: 0.9 INJECTION, SOLUTION INTRAVENOUS at 08:05

## 2022-05-21 RX ADMIN — PIPERACILLIN SODIUM,TAZOBACTAM SODIUM 3.38 G: 3; .375 INJECTION, POWDER, FOR SOLUTION INTRAVENOUS at 12:05

## 2022-05-21 RX ADMIN — AMLODIPINE BESYLATE 5 MG: 5 TABLET ORAL at 09:05

## 2022-05-21 RX ADMIN — LOSARTAN POTASSIUM 50 MG: 50 TABLET, FILM COATED ORAL at 09:05

## 2022-05-21 RX ADMIN — THERA TABS 1 TABLET: TAB at 09:05

## 2022-05-21 RX ADMIN — PIPERACILLIN SODIUM,TAZOBACTAM SODIUM 3.38 G: 3; .375 INJECTION, POWDER, FOR SOLUTION INTRAVENOUS at 08:05

## 2022-05-21 NOTE — ASSESSMENT & PLAN NOTE
1. Agree with IV antibiotics.  Patient appears to be improving clinically.  2. No surgical plans at this time.  Hopefully can be discharged the next day or 2. He plans on following up with his primary gastroenterologist in [place].

## 2022-05-21 NOTE — HPI
61-year-old male admitted with perforated diverticulitis of the right colon at the cecum.  Patient says he has had diverticulitis intermittently for the last 10 years.  He is followed by the GI group in [place].  He has never had surgery for this.  He has received multiple courses of antibiotics previously.  He feels much better today than yesterday when he was admitted.  CT scan showed evidence of a very tiny micro perforation which was contained.  There is inflammation of the cecum.  He has not had any fever or chills.  The pain he has with diverticulitis is usually on the left.

## 2022-05-21 NOTE — ASSESSMENT & PLAN NOTE
Had Multiple episodes of diverticulitis before, which was managed on outpatient basis  Now getting admitted with a/c diverticulitis with microperforation  Continue iv zosyn and iv diflucan  Home within 24-48 hrs and follow up with surgeon/GI MD in clinic

## 2022-05-21 NOTE — SUBJECTIVE & OBJECTIVE
Interval History:     Review of Systems   Constitutional:  Negative for activity change and appetite change.   HENT:  Negative for congestion and dental problem.    Eyes:  Negative for discharge and itching.   Respiratory:  Negative for shortness of breath.    Cardiovascular:  Negative for chest pain.   Gastrointestinal:  Negative for abdominal distention and abdominal pain.   Endocrine: Negative for cold intolerance.   Genitourinary:  Negative for difficulty urinating and dysuria.   Musculoskeletal:  Negative for arthralgias and back pain.   Skin:  Negative for color change.   Neurological:  Negative for dizziness and facial asymmetry.   Hematological:  Negative for adenopathy.   Psychiatric/Behavioral:  Negative for agitation and behavioral problems.    Objective:     Vital Signs (Most Recent):  Temp: 99.1 °F (37.3 °C) (05/21/22 1045)  Pulse: 67 (05/21/22 1045)  Resp: 18 (05/21/22 1045)  BP: (!) 101/55 (05/21/22 1045)  SpO2: 95 % (05/21/22 1045)   Vital Signs (24h Range):  Temp:  [98.7 °F (37.1 °C)-99.3 °F (37.4 °C)] 99.1 °F (37.3 °C)  Pulse:  [67-88] 67  Resp:  [18-29] 18  SpO2:  [93 %-97 %] 95 %  BP: (101-148)/(55-81) 101/55     Weight: 106.6 kg (235 lb)  Body mass index is 34.7 kg/m².    Intake/Output Summary (Last 24 hours) at 5/21/2022 1458  Last data filed at 5/20/2022 1900  Gross per 24 hour   Intake --   Output 1100 ml   Net -1100 ml      Physical Exam  Vitals and nursing note reviewed.   Constitutional:       Appearance: He is well-developed.   HENT:      Head: Atraumatic.      Right Ear: External ear normal.      Left Ear: External ear normal.      Nose: Nose normal.      Mouth/Throat:      Mouth: Mucous membranes are moist.   Eyes:      General: No scleral icterus.  Cardiovascular:      Rate and Rhythm: Normal rate.   Pulmonary:      Effort: Pulmonary effort is normal.   Abdominal:      General: There is no distension.   Musculoskeletal:         General: Normal range of motion.      Cervical back: Full  passive range of motion without pain and normal range of motion.   Skin:     General: Skin is warm.   Neurological:      Mental Status: He is alert and oriented to person, place, and time.   Psychiatric:         Behavior: Behavior normal.       Significant Labs: All pertinent labs within the past 24 hours have been reviewed.  CBC:   Recent Labs   Lab 05/20/22  1328 05/21/22  0500   WBC 13.30* 10.38   HGB 15.0 14.0   HCT 44.6 42.3    250     CMP:   Recent Labs   Lab 05/20/22  1328 05/21/22  0458   * 137   K 3.5 3.4*    101   CO2 25 26    99   BUN 16 16   CREATININE 1.3 1.3   CALCIUM 9.3 8.9   PROT 7.3  --    ALBUMIN 4.2  --    BILITOT 1.5*  --    ALKPHOS 44*  --    AST 18  --    ALT 20  --    ANIONGAP 8 10   EGFRNONAA 59.7* 59.7*       Significant Imaging: I have reviewed all pertinent imaging results/findings within the past 24 hours.

## 2022-05-21 NOTE — PROGRESS NOTES
[facility] Medicine  Progress Note    Patient Name: Omari Jaquez  MRN: [id number]  Patient Class: IP- Inpatient   Admission Date: 5/20/2022  Length of Stay: 1 days  Attending Physician: Oumar Alexis MD  Primary Care Provider: Kojo Mcqueen MD        Subjective:     Principal Problem:Acute diverticulitis        HPI:  Mr. Jaquez is a 61 year old male with a history of HTN, HLD, and diverticulitis who presents today with complaints of RLQ pain beginning yesterday. It is moderate. It is associated with mild nausea. He denies diarrhea, melena, hematochezia, vomiting, chest pain or SOB. On arrival to the ED he had a CT abd/pelvis that revealed: Moderate acute cecal diverticulitis with a tiny focal contained perforation without evidence of abscess. WBCS 13K. Temp on arrival was 100.1.       Overview/Hospital Course:  05/21  Per pt he feels much better  No new issues      Interval History:     Review of Systems   Constitutional:  Negative for activity change and appetite change.   HENT:  Negative for congestion and dental problem.    Eyes:  Negative for discharge and itching.   Respiratory:  Negative for shortness of breath.    Cardiovascular:  Negative for chest pain.   Gastrointestinal:  Negative for abdominal distention and abdominal pain.   Endocrine: Negative for cold intolerance.   Genitourinary:  Negative for difficulty urinating and dysuria.   Musculoskeletal:  Negative for arthralgias and back pain.   Skin:  Negative for color change.   Neurological:  Negative for dizziness and facial asymmetry.   Hematological:  Negative for adenopathy.   Psychiatric/Behavioral:  Negative for agitation and behavioral problems.    Objective:     Vital Signs (Most Recent):  Temp: 99.1 °F (37.3 °C) (05/21/22 1045)  Pulse: 67 (05/21/22 1045)  Resp: 18 (05/21/22 1045)  BP: (!) 101/55 (05/21/22 1045)  SpO2: 95 % (05/21/22 1045)   Vital Signs (24h Range):  Temp:  [98.7 °F (37.1 °C)-99.3 °F (37.4 °C)] 99.1 °F (37.3  °C)  Pulse:  [67-88] 67  Resp:  [18-29] 18  SpO2:  [93 %-97 %] 95 %  BP: (101-148)/(55-81) 101/55     Weight: 106.6 kg (235 lb)  Body mass index is 34.7 kg/m².    Intake/Output Summary (Last 24 hours) at 5/21/2022 1458  Last data filed at 5/20/2022 1900  Gross per 24 hour   Intake --   Output 1100 ml   Net -1100 ml      Physical Exam  Vitals and nursing note reviewed.   Constitutional:       Appearance: He is well-developed.   HENT:      Head: Atraumatic.      Right Ear: External ear normal.      Left Ear: External ear normal.      Nose: Nose normal.      Mouth/Throat:      Mouth: Mucous membranes are moist.   Eyes:      General: No scleral icterus.  Cardiovascular:      Rate and Rhythm: Normal rate.   Pulmonary:      Effort: Pulmonary effort is normal.   Abdominal:      General: There is no distension.   Musculoskeletal:         General: Normal range of motion.      Cervical back: Full passive range of motion without pain and normal range of motion.   Skin:     General: Skin is warm.   Neurological:      Mental Status: He is alert and oriented to person, place, and time.   Psychiatric:         Behavior: Behavior normal.       Significant Labs: All pertinent labs within the past 24 hours have been reviewed.  CBC:   Recent Labs   Lab 05/20/22  1328 05/21/22  0500   WBC 13.30* 10.38   HGB 15.0 14.0   HCT 44.6 42.3    250     CMP:   Recent Labs   Lab 05/20/22  1328 05/21/22  0458   * 137   K 3.5 3.4*    101   CO2 25 26    99   BUN 16 16   CREATININE 1.3 1.3   CALCIUM 9.3 8.9   PROT 7.3  --    ALBUMIN 4.2  --    BILITOT 1.5*  --    ALKPHOS 44*  --    AST 18  --    ALT 20  --    ANIONGAP 8 10   EGFRNONAA 59.7* 59.7*       Significant Imaging: I have reviewed all pertinent imaging results/findings within the past 24 hours.      Assessment/Plan:      * Acute diverticulitis with microperforation  Had Multiple episodes of diverticulitis before, which was managed on outpatient basis  Now getting  admitted with a/c diverticulitis with microperforation  Continue iv zosyn and iv diflucan  Home within 24-48 hrs and follow up with surgeon/GI MD in clinic      Essential hypertension  Continue appropriate home meds      Leukocytosis  Resolved         VTE Risk Mitigation (From admission, onward)         Ordered     IP VTE HIGH RISK PATIENT  Once         05/20/22 2017     Place sequential compression device  Until discontinued         05/20/22 2017                Discharge Planning   BHARAT: 5/23/2022     Code Status: Full Code   Is the patient medically ready for discharge?:     Reason for patient still in hospital (select all that apply): Treatment  Discharge Plan A: Home                  Oumar Alexis MD  Department of Hospital Medicine   [facility]

## 2022-05-21 NOTE — SUBJECTIVE & OBJECTIVE
No current facility-administered medications on file prior to encounter.     Current Outpatient Medications on File Prior to Encounter   Medication Sig    amLODIPine (NORVASC) 5 MG tablet Take 5 mg by mouth once daily.    aspirin (ECOTRIN) 81 MG EC tablet Take 81 mg by mouth once daily.    fenofibrate (TRICOR) 145 MG tablet Take 145 mg by mouth once daily.    multivitamin (THERAGRAN) per tablet Take 1 tablet by mouth once daily.    omega-3 fatty acids/fish oil (FISH OIL-OMEGA-3 FATTY ACIDS) 300-1,000 mg capsule Take 1 capsule by mouth once daily.    simvastatin (ZOCOR) 40 MG tablet Take 40 mg by mouth once daily.    telmisartan (MICARDIS) 40 MG Tab Take 40 mg by mouth once daily.       Review of patient's allergies indicates:  No Known Allergies    Past Medical History:   Diagnosis Date    Digestive disorder     Hypertension     Mixed hyperlipidemia      Past Surgical History:   Procedure Laterality Date    COLONOSCOPY       Family History       Problem Relation (Age of Onset)    Hyperlipidemia Father          Tobacco Use    Smoking status: Unknown If Ever Smoked    Smokeless tobacco: Not on file   Substance and Sexual Activity    Alcohol use: Not Currently    Drug use: Not on file    Sexual activity: Not on file     Review of Systems   Constitutional:  Negative for chills, fatigue, fever and unexpected weight change.   HENT:  Negative for congestion, sore throat, trouble swallowing and voice change.    Eyes:  Negative for redness and visual disturbance.   Respiratory:  Negative for cough, shortness of breath and wheezing.    Cardiovascular:  Negative for chest pain and palpitations.   Gastrointestinal:  Positive for abdominal pain. Negative for blood in stool, nausea and vomiting.   Genitourinary:  Negative for dysuria, frequency, hematuria and urgency.   Musculoskeletal:  Negative for arthralgias, myalgias and neck pain.   Skin:  Negative for rash and wound.   Allergic/Immunologic: Negative.    Neurological:   Negative for tremors, seizures, weakness and headaches.   Hematological:  Does not bruise/bleed easily.   Psychiatric/Behavioral:  Negative for confusion and dysphoric mood. The patient is not nervous/anxious.    Objective:     Vital Signs (Most Recent):  Temp: 99.1 °F (37.3 °C) (05/21/22 1045)  Pulse: 67 (05/21/22 1045)  Resp: 18 (05/21/22 1045)  BP: (!) 101/55 (05/21/22 1045)  SpO2: 95 % (05/21/22 1045)   Vital Signs (24h Range):  Temp:  [98.7 °F (37.1 °C)-99.3 °F (37.4 °C)] 99.1 °F (37.3 °C)  Pulse:  [67-88] 67  Resp:  [18-29] 18  SpO2:  [93 %-97 %] 95 %  BP: (101-148)/(55-81) 101/55     Weight: 106.6 kg (235 lb)  Body mass index is 34.7 kg/m².    Physical Exam  Constitutional:       General: He is not in acute distress.     Appearance: He is well-developed.   HENT:      Head: Normocephalic and atraumatic.   Eyes:      General: No scleral icterus.     Conjunctiva/sclera: Conjunctivae normal.      Pupils: Pupils are equal, round, and reactive to light.   Neck:      Thyroid: No thyromegaly.   Cardiovascular:      Rate and Rhythm: Normal rate and regular rhythm.      Heart sounds: Normal heart sounds. No murmur heard.  Pulmonary:      Effort: Pulmonary effort is normal. No respiratory distress.      Breath sounds: Normal breath sounds. No wheezing or rales.   Abdominal:      General: Bowel sounds are normal. There is no distension.      Palpations: Abdomen is soft.      Tenderness: There is abdominal tenderness. There is no guarding or rebound.      Hernia: No hernia is present.   Musculoskeletal:         General: No tenderness. Normal range of motion.      Cervical back: Normal range of motion and neck supple.   Lymphadenopathy:      Cervical: No cervical adenopathy.   Skin:     General: Skin is warm and dry.      Findings: No erythema or rash.   Neurological:      Mental Status: He is alert and oriented to person, place, and time.   Psychiatric:         Behavior: Behavior normal.         Judgment: Judgment normal.        Significant Labs:  I have reviewed all pertinent lab results within the past 24 hours.  CBC:   Recent Labs   Lab 05/21/22  0500   WBC 10.38   RBC 4.44*   HGB 14.0   HCT 42.3      MCV 95   MCH 31.5*   MCHC 33.1     CMP:   Recent Labs   Lab 05/20/22  1328 05/21/22  0458    99   CALCIUM 9.3 8.9   ALBUMIN 4.2  --    PROT 7.3  --    * 137   K 3.5 3.4*   CO2 25 26    101   BUN 16 16   CREATININE 1.3 1.3   ALKPHOS 44*  --    ALT 20  --    AST 18  --    BILITOT 1.5*  --        Significant Diagnostics:  I have reviewed all pertinent imaging results/findings within the past 24 hours.  CT: I have reviewed all pertinent results/findings within the past 24 hours and my personal findings are:  Diverticulitis

## 2022-05-21 NOTE — PLAN OF CARE
[facility]  Initial Discharge Assessment       Primary Care Provider: Kojo Mcqueen MD    Admission Diagnosis: Acute diverticulitis [K57.92]    Admission Date: 5/20/2022  Expected Discharge Date: 5/23/2022    Discharge Barriers Identified: (P) None    Assessment completed at bedside with pt. Pt lives alone and plans to return home at discharge. Pt does not use any DME at home and is not requesting any for discharge. Pt states he thinks his brother is his POA but he isn't certain. Pt is not requesting any other information regarding POA/AD. Pt asked that his GI doctor, Ryanne Teague with [facility] ([phone]) be consulted or informed of any procedures.     Payor: [employer] / Plan:  [employer] / Product Type: Government /     Extended Emergency Contact Information  Primary Emergency Contact: ChanningJuju feliciano  Mobile Phone: [phone]  Relation: Sister  Preferred language: English   needed? No    Discharge Plan A: (P) Home  Discharge Plan B: (P) Home    No Pharmacies Listed    Initial Assessment (most recent)       Adult Discharge Assessment - 05/21/22 1009          Discharge Assessment    Assessment Type Discharge Planning Assessment (P)      Confirmed/corrected address, phone number and insurance Yes (P)      Confirmed Demographics Correct on Facesheet (P)      Source of Information patient (P)      When was your last doctors appointment? 05/13/22 (P)    Dr Ace    Communicated BHARAT with patient/caregiver Date not available/Unable to determine (P)      Reason For Admission stomach cramps, severe (P)      Lives With alone (P)      Facility Arrived From: home (P)      Do you expect to return to your current living situation? Yes (P)      Do you have help at home or someone to help you manage your care at home? No (P)      Prior to hospitilization cognitive status: Alert/Oriented (P)      Current cognitive status: Alert/Oriented (P)      Walking or  Climbing Stairs Difficulty none (P)      Dressing/Bathing Difficulty none (P)      Home Accessibility wheelchair accessible (P)      Home Layout Able to live on 1st floor (P)      Equipment Currently Used at Home none (P)      Readmission within 30 days? No (P)      Patient currently being followed by outpatient case management? No (P)      Do you currently have service(s) that help you manage your care at home? No (P)      Do you take prescription medications? Yes (P)      Do you have prescription coverage? Yes (P)      Coverage  (P)      Do you have any problems affording any of your prescribed medications? No (P)      Is the patient taking medications as prescribed? yes (P)      Who is going to help you get home at discharge? self (P)      How do you get to doctors appointments? car, drives self (P)      Are you on dialysis? No (P)      Do you take coumadin? No (P)      Discharge Plan A Home (P)      Discharge Plan B Home (P)      DME Needed Upon Discharge  none (P)      Discharge Plan discussed with: Patient (P)      Discharge Barriers Identified None (P)

## 2022-05-21 NOTE — CONSULTS
[facility]  General Surgery  Consult Note    Patient Name: Omari Jaquez  MRN: [id number]  Code Status: Full Code  Admission Date: 5/20/2022  Hospital Length of Stay: 1 days  Attending Physician: Oumar Alexis MD  Primary Care Provider: Kojo Mcqueen MD    Patient information was obtained from patient and ER records.     Inpatient consult to General Surgery  Consult performed by: Abelino Vasquez MD  Consult ordered by: Keyla Jauregui NP        Subjective:     Principal Problem: Acute diverticulitis    History of Present Illness: 61-year-old male admitted with perforated diverticulitis of the right colon at the cecum.  Patient says he has had diverticulitis intermittently for the last 10 years.  He is followed by the GI group in [place].  He has never had surgery for this.  He has received multiple courses of antibiotics previously.  He feels much better today than yesterday when he was admitted.  CT scan showed evidence of a very tiny micro perforation which was contained.  There is inflammation of the cecum.  He has not had any fever or chills.  The pain he has with diverticulitis is usually on the left.      No current facility-administered medications on file prior to encounter.     Current Outpatient Medications on File Prior to Encounter   Medication Sig    amLODIPine (NORVASC) 5 MG tablet Take 5 mg by mouth once daily.    aspirin (ECOTRIN) 81 MG EC tablet Take 81 mg by mouth once daily.    fenofibrate (TRICOR) 145 MG tablet Take 145 mg by mouth once daily.    multivitamin (THERAGRAN) per tablet Take 1 tablet by mouth once daily.    omega-3 fatty acids/fish oil (FISH OIL-OMEGA-3 FATTY ACIDS) 300-1,000 mg capsule Take 1 capsule by mouth once daily.    simvastatin (ZOCOR) 40 MG tablet Take 40 mg by mouth once daily.    telmisartan (MICARDIS) 40 MG Tab Take 40 mg by mouth once daily.       Review of patient's allergies indicates:  No Known Allergies    Past Medical History:   Diagnosis  Date    Digestive disorder     Hypertension     Mixed hyperlipidemia      Past Surgical History:   Procedure Laterality Date    COLONOSCOPY       Family History       Problem Relation (Age of Onset)    Hyperlipidemia Father          Tobacco Use    Smoking status: Unknown If Ever Smoked    Smokeless tobacco: Not on file   Substance and Sexual Activity    Alcohol use: Not Currently    Drug use: Not on file    Sexual activity: Not on file     Review of Systems   Constitutional:  Negative for chills, fatigue, fever and unexpected weight change.   HENT:  Negative for congestion, sore throat, trouble swallowing and voice change.    Eyes:  Negative for redness and visual disturbance.   Respiratory:  Negative for cough, shortness of breath and wheezing.    Cardiovascular:  Negative for chest pain and palpitations.   Gastrointestinal:  Positive for abdominal pain. Negative for blood in stool, nausea and vomiting.   Genitourinary:  Negative for dysuria, frequency, hematuria and urgency.   Musculoskeletal:  Negative for arthralgias, myalgias and neck pain.   Skin:  Negative for rash and wound.   Allergic/Immunologic: Negative.    Neurological:  Negative for tremors, seizures, weakness and headaches.   Hematological:  Does not bruise/bleed easily.   Psychiatric/Behavioral:  Negative for confusion and dysphoric mood. The patient is not nervous/anxious.    Objective:     Vital Signs (Most Recent):  Temp: 99.1 °F (37.3 °C) (05/21/22 1045)  Pulse: 67 (05/21/22 1045)  Resp: 18 (05/21/22 1045)  BP: (!) 101/55 (05/21/22 1045)  SpO2: 95 % (05/21/22 1045)   Vital Signs (24h Range):  Temp:  [98.7 °F (37.1 °C)-99.3 °F (37.4 °C)] 99.1 °F (37.3 °C)  Pulse:  [67-88] 67  Resp:  [18-29] 18  SpO2:  [93 %-97 %] 95 %  BP: (101-148)/(55-81) 101/55     Weight: 106.6 kg (235 lb)  Body mass index is 34.7 kg/m².    Physical Exam  Constitutional:       General: He is not in acute distress.     Appearance: He is well-developed.   HENT:       Head: Normocephalic and atraumatic.   Eyes:      General: No scleral icterus.     Conjunctiva/sclera: Conjunctivae normal.      Pupils: Pupils are equal, round, and reactive to light.   Neck:      Thyroid: No thyromegaly.   Cardiovascular:      Rate and Rhythm: Normal rate and regular rhythm.      Heart sounds: Normal heart sounds. No murmur heard.  Pulmonary:      Effort: Pulmonary effort is normal. No respiratory distress.      Breath sounds: Normal breath sounds. No wheezing or rales.   Abdominal:      General: Bowel sounds are normal. There is no distension.      Palpations: Abdomen is soft.      Tenderness: There is abdominal tenderness. There is no guarding or rebound.      Hernia: No hernia is present.   Musculoskeletal:         General: No tenderness. Normal range of motion.      Cervical back: Normal range of motion and neck supple.   Lymphadenopathy:      Cervical: No cervical adenopathy.   Skin:     General: Skin is warm and dry.      Findings: No erythema or rash.   Neurological:      Mental Status: He is alert and oriented to person, place, and time.   Psychiatric:         Behavior: Behavior normal.         Judgment: Judgment normal.       Significant Labs:  I have reviewed all pertinent lab results within the past 24 hours.  CBC:   Recent Labs   Lab 05/21/22  0500   WBC 10.38   RBC 4.44*   HGB 14.0   HCT 42.3      MCV 95   MCH 31.5*   MCHC 33.1     CMP:   Recent Labs   Lab 05/20/22  1328 05/21/22  0458    99   CALCIUM 9.3 8.9   ALBUMIN 4.2  --    PROT 7.3  --    * 137   K 3.5 3.4*   CO2 25 26    101   BUN 16 16   CREATININE 1.3 1.3   ALKPHOS 44*  --    ALT 20  --    AST 18  --    BILITOT 1.5*  --        Significant Diagnostics:  I have reviewed all pertinent imaging results/findings within the past 24 hours.  CT: I have reviewed all pertinent results/findings within the past 24 hours and my personal findings are:  Diverticulitis      Assessment/Plan:     * Acute diverticulitis  with microperforation  1. Agree with IV antibiotics.  Patient appears to be improving clinically.  2. No surgical plans at this time.  Hopefully can be discharged the next day or 2. He plans on following up with his primary gastroenterologist in [place].      VTE Risk Mitigation (From admission, onward)         Ordered     IP VTE HIGH RISK PATIENT  Once         05/20/22 2017     Place sequential compression device  Until discontinued         05/20/22 2017                Thank you for your consult. I will follow-up with patient. Please contact us if you have any additional questions.    Abelino Vasquez MD  General Surgery  [facility]

## 2022-05-21 NOTE — HOSPITAL COURSE
Patient is a 61-year-old gentleman with a history of recurrent diverticulitis, hypertension, hyperlipidemia who is presenting with abdominal pain and was found to have diverticulitis of the cecum with small perforation.  He was admitted for IV antibiotics and was evaluated by the general surgery team.  He had significant clinical improvement with medical management alone with resolution of his pain and no further evidence of systemic infection.  His general surgery team recommended that he follow-up as an outpatient with his gastroenterologist.  He was able to be transitioned from IV antibiotics to oral antibiotics to complete a 14 day course of therapy given his perforation.  I have given him strict return precautions and caution him regarding symptoms that could indicate development of worsening perforation or abscess.  He will follow-up with his primary care physician within 1 week and follow up with his gastroenterologist within the next month.  He has had a recent colonoscopy in March without significant findings other than a small polyp at the time.  He is felt to be safe and stable for discharge home with close follow-up as an outpatient and caution about returning if his symptoms worsen or recur at home.

## 2022-05-22 VITALS
RESPIRATION RATE: 18 BRPM | HEART RATE: 69 BPM | OXYGEN SATURATION: 96 % | BODY MASS INDEX: 34.8 KG/M2 | WEIGHT: 235 LBS | SYSTOLIC BLOOD PRESSURE: 126 MMHG | HEIGHT: 69 IN | DIASTOLIC BLOOD PRESSURE: 78 MMHG | TEMPERATURE: 98 F

## 2022-05-22 LAB
ANION GAP SERPL CALC-SCNC: 9 MMOL/L (ref 8–16)
BASOPHILS # BLD AUTO: 0.05 K/UL (ref 0–0.2)
BASOPHILS NFR BLD: 0.8 % (ref 0–1.9)
BUN SERPL-MCNC: 14 MG/DL (ref 6–20)
CALCIUM SERPL-MCNC: 8.7 MG/DL (ref 8.7–10.5)
CHLORIDE SERPL-SCNC: 102 MMOL/L (ref 95–110)
CO2 SERPL-SCNC: 26 MMOL/L (ref 23–29)
CREAT SERPL-MCNC: 1.2 MG/DL (ref 0.5–1.4)
DIFFERENTIAL METHOD: ABNORMAL
EOSINOPHIL # BLD AUTO: 0.4 K/UL (ref 0–0.5)
EOSINOPHIL NFR BLD: 5.7 % (ref 0–8)
ERYTHROCYTE [DISTWIDTH] IN BLOOD BY AUTOMATED COUNT: 12.6 % (ref 11.5–14.5)
EST. GFR  (AFRICAN AMERICAN): >60 ML/MIN/1.73 M^2
EST. GFR  (NON AFRICAN AMERICAN): >60 ML/MIN/1.73 M^2
GLUCOSE SERPL-MCNC: 162 MG/DL (ref 70–110)
HCT VFR BLD AUTO: 42.5 % (ref 40–54)
HGB BLD-MCNC: 14.4 G/DL (ref 14–18)
IMM GRANULOCYTES # BLD AUTO: 0.04 K/UL (ref 0–0.04)
IMM GRANULOCYTES NFR BLD AUTO: 0.6 % (ref 0–0.5)
LYMPHOCYTES # BLD AUTO: 1.7 K/UL (ref 1–4.8)
LYMPHOCYTES NFR BLD: 25.5 % (ref 18–48)
MAGNESIUM SERPL-MCNC: 1.9 MG/DL (ref 1.6–2.6)
MCH RBC QN AUTO: 31.9 PG (ref 27–31)
MCHC RBC AUTO-ENTMCNC: 33.9 G/DL (ref 32–36)
MCV RBC AUTO: 94 FL (ref 82–98)
MONOCYTES # BLD AUTO: 0.7 K/UL (ref 0.3–1)
MONOCYTES NFR BLD: 11.3 % (ref 4–15)
NEUTROPHILS # BLD AUTO: 3.7 K/UL (ref 1.8–7.7)
NEUTROPHILS NFR BLD: 56.1 % (ref 38–73)
NRBC BLD-RTO: 0 /100 WBC
PLATELET # BLD AUTO: 264 K/UL (ref 150–450)
PMV BLD AUTO: 10.3 FL (ref 9.2–12.9)
POTASSIUM SERPL-SCNC: 3.3 MMOL/L (ref 3.5–5.1)
RBC # BLD AUTO: 4.52 M/UL (ref 4.6–6.2)
SODIUM SERPL-SCNC: 137 MMOL/L (ref 136–145)
WBC # BLD AUTO: 6.52 K/UL (ref 3.9–12.7)

## 2022-05-22 PROCEDURE — 63600175 PHARM REV CODE 636 W HCPCS: Performed by: INTERNAL MEDICINE

## 2022-05-22 PROCEDURE — 83735 ASSAY OF MAGNESIUM: CPT | Performed by: NURSE PRACTITIONER

## 2022-05-22 PROCEDURE — 25000003 PHARM REV CODE 250: Performed by: NURSE PRACTITIONER

## 2022-05-22 PROCEDURE — 63600175 PHARM REV CODE 636 W HCPCS: Performed by: NURSE PRACTITIONER

## 2022-05-22 PROCEDURE — 36415 COLL VENOUS BLD VENIPUNCTURE: CPT | Performed by: NURSE PRACTITIONER

## 2022-05-22 PROCEDURE — 85025 COMPLETE CBC W/AUTO DIFF WBC: CPT | Performed by: NURSE PRACTITIONER

## 2022-05-22 PROCEDURE — 80048 BASIC METABOLIC PNL TOTAL CA: CPT | Performed by: NURSE PRACTITIONER

## 2022-05-22 RX ORDER — CIPROFLOXACIN 500 MG/1
500 TABLET ORAL EVERY 12 HOURS
Qty: 22 TABLET | Refills: 0 | Status: SHIPPED | OUTPATIENT
Start: 2022-05-22 | End: 2022-06-02

## 2022-05-22 RX ORDER — FLUCONAZOLE 200 MG/1
400 TABLET ORAL DAILY
Qty: 22 TABLET | Refills: 0 | Status: SHIPPED | OUTPATIENT
Start: 2022-05-22 | End: 2022-06-02

## 2022-05-22 RX ORDER — METRONIDAZOLE 500 MG/1
500 TABLET ORAL EVERY 8 HOURS
Qty: 33 TABLET | Refills: 0 | Status: SHIPPED | OUTPATIENT
Start: 2022-05-22 | End: 2022-06-02

## 2022-05-22 RX ADMIN — AMLODIPINE BESYLATE 5 MG: 5 TABLET ORAL at 08:05

## 2022-05-22 RX ADMIN — PIPERACILLIN SODIUM,TAZOBACTAM SODIUM 3.38 G: 3; .375 INJECTION, POWDER, FOR SOLUTION INTRAVENOUS at 04:05

## 2022-05-22 RX ADMIN — ATORVASTATIN CALCIUM 20 MG: 20 TABLET, FILM COATED ORAL at 08:05

## 2022-05-22 RX ADMIN — THERA TABS 1 TABLET: TAB at 08:05

## 2022-05-22 RX ADMIN — FENOFIBRATE 145 MG: 145 TABLET, FILM COATED ORAL at 08:05

## 2022-05-22 RX ADMIN — FLUCONAZOLE 100 MG: 2 INJECTION, SOLUTION INTRAVENOUS at 08:05

## 2022-05-22 RX ADMIN — LOSARTAN POTASSIUM 50 MG: 50 TABLET, FILM COATED ORAL at 08:05

## 2022-05-22 NOTE — DISCHARGE SUMMARY
[facility]  Discharge Summary      Patient Name: Omari Jaquez  MRN: [id number]  Patient Class: IP- Inpatient  Admission Date: 5/20/2022  Hospital Length of Stay: 2 days  Discharge Date and Time:  05/22/2022 12:57 PM  Attending Physician: No att. providers found   Discharging Provider: Flip Solano MD  Primary Care Provider: Kojo Mcqueen MD      HPI:   Mr. Jaquez is a 61 year old male with a history of HTN, HLD, and diverticulitis who presents today with complaints of RLQ pain beginning yesterday. It is moderate. It is associated with mild nausea. He denies diarrhea, melena, hematochezia, vomiting, chest pain or SOB. On arrival to the ED he had a CT abd/pelvis that revealed: Moderate acute cecal diverticulitis with a tiny focal contained perforation without evidence of abscess. WBCS 13K. Temp on arrival was 100.1.       * No surgery found *      Hospital Course:   Patient is a 61-year-old gentleman with a history of recurrent diverticulitis, hypertension, hyperlipidemia who is presenting with abdominal pain and was found to have diverticulitis of the cecum with small perforation.  He was admitted for IV antibiotics and was evaluated by the general surgery team.  He had significant clinical improvement with medical management alone with resolution of his pain and no further evidence of systemic infection.  His general surgery team recommended that he follow-up as an outpatient with his gastroenterologist.  He was able to be transitioned from IV antibiotics to oral antibiotics to complete a 14 day course of therapy given his perforation.  I have given him strict return precautions and caution him regarding symptoms that could indicate development of worsening perforation or abscess.  He will follow-up with his primary care physician within 1 week and follow up with his gastroenterologist within the next month.  He has had a recent colonoscopy in March without significant findings  other than a small polyp at the time.  He is felt to be safe and stable for discharge home with close follow-up as an outpatient and caution about returning if his symptoms worsen or recur at home.       Goals of Care Treatment Preferences:  Code Status: Full Code      Consults:   Consults (From admission, onward)        Status Ordering Provider     Inpatient consult to General Surgery  Once        Provider:  Abelino Vasquez MD    Completed MORENA SAM          No new Assessment & Plan notes have been filed under this hospital service since the last note was generated.  Service: Hospital Medicine    Final Active Diagnoses:    Diagnosis Date Noted POA    PRINCIPAL PROBLEM:  Acute diverticulitis with microperforation [K57.92] 05/20/2022 Yes    Leukocytosis [D72.829] 05/20/2022 Yes    Essential hypertension [I10] 05/20/2022 Yes      Problems Resolved During this Admission:       Discharged Condition: good    Disposition: Home or Self Care    Follow Up:   Follow-up Information     Kojo Mcqueen MD. Schedule an appointment as soon as possible for a visit in 1 week(s).    Specialty: Internal Medicine  Contact information:  [address] [place] [id number] [phone]                       Patient Instructions:      Diet Adult Regular     No dressing needed     Activity as tolerated       Significant Diagnostic Studies: Labs:   BMP:   Recent Labs   Lab 05/20/22  1328 05/21/22  0458 05/22/22  0838    99 162*   * 137 137   K 3.5 3.4* 3.3*    101 102   CO2 25 26 26   BUN 16 16 14   CREATININE 1.3 1.3 1.2   CALCIUM 9.3 8.9 8.7   MG  --  1.8 1.9   , CBC   Recent Labs   Lab 05/20/22  1328 05/21/22  0500 05/22/22  0838   WBC 13.30* 10.38 6.52   HGB 15.0 14.0 14.4   HCT 44.6 42.3 42.5    250 264    and All labs within the past 24 hours have been reviewed  Microbiology:   Blood Culture   Lab Results   Component Value Date    LABBLOO No Growth to date 05/21/2022     LABBLOO No Growth to date 05/21/2022       Pending Diagnostic Studies:     None         Medications:  Reconciled Home Medications:      Medication List      START taking these medications    ciprofloxacin HCl 500 MG tablet  Commonly known as: CIPRO  Take 1 tablet (500 mg total) by mouth every 12 (twelve) hours. for 11 days     fluconazole 200 MG Tab  Commonly known as: DIFLUCAN  Take 2 tablets (400 mg total) by mouth once daily. for 11 days     metroNIDAZOLE 500 MG tablet  Commonly known as: FLAGYL  Take 1 tablet (500 mg total) by mouth every 8 (eight) hours. for 11 days        CONTINUE taking these medications    amLODIPine 5 MG tablet  Commonly known as: NORVASC  Take 5 mg by mouth once daily.     aspirin 81 MG EC tablet  Commonly known as: ECOTRIN  Take 81 mg by mouth once daily.     fenofibrate 145 MG tablet  Commonly known as: TRICOR  Take 145 mg by mouth once daily.     fish oil-omega-3 fatty acids 300-1,000 mg capsule  Take 1 capsule by mouth once daily.     multivitamin per tablet  Commonly known as: THERAGRAN  Take 1 tablet by mouth once daily.     simvastatin 40 MG tablet  Commonly known as: ZOCOR  Take 40 mg by mouth once daily.     telmisartan 40 MG Tab  Commonly known as: MICARDIS  Take 40 mg by mouth once daily.            Indwelling Lines/Drains at time of discharge:   Lines/Drains/Airways     None                 Time spent on the discharge of patient: 52 minutes         Flip Solano MD  Department of Hospital Medicine  [facility]

## 2022-05-22 NOTE — PLAN OF CARE
05/22/22 1140   Final Note   Assessment Type Final Discharge Note   Anticipated Discharge Disposition Home   What phone number can be called within the next 1-3 days to see how you are doing after discharge? [phone]   Post-Acute Status   Coverage    Discharge Delays None known at this time         Discharge orders and chart reviewed with no further post-acute discharge needs identified at this time.  At this time, patient is cleared for discharge from Case Management standpoint.

## 2022-05-26 LAB — BACTERIA BLD CULT: NORMAL
